# Patient Record
Sex: FEMALE | Race: WHITE
[De-identification: names, ages, dates, MRNs, and addresses within clinical notes are randomized per-mention and may not be internally consistent; named-entity substitution may affect disease eponyms.]

---

## 2018-04-27 ENCOUNTER — HOSPITAL ENCOUNTER (EMERGENCY)
Dept: HOSPITAL 96 - M.ERS | Age: 33
Discharge: HOME | End: 2018-04-27
Payer: COMMERCIAL

## 2018-04-27 VITALS — HEIGHT: 64 IN | BODY MASS INDEX: 20.49 KG/M2 | WEIGHT: 120 LBS

## 2018-04-27 VITALS — SYSTOLIC BLOOD PRESSURE: 97 MMHG | DIASTOLIC BLOOD PRESSURE: 50 MMHG

## 2018-04-27 DIAGNOSIS — Z90.710: ICD-10-CM

## 2018-04-27 DIAGNOSIS — Z88.8: ICD-10-CM

## 2018-04-27 DIAGNOSIS — L02.415: Primary | ICD-10-CM

## 2018-04-27 DIAGNOSIS — Z88.0: ICD-10-CM

## 2018-04-27 DIAGNOSIS — Z87.442: ICD-10-CM

## 2018-04-27 DIAGNOSIS — Z90.49: ICD-10-CM

## 2018-04-27 DIAGNOSIS — Z91.040: ICD-10-CM

## 2018-04-27 DIAGNOSIS — Z88.1: ICD-10-CM

## 2018-05-01 ENCOUNTER — HOSPITAL ENCOUNTER (EMERGENCY)
Dept: HOSPITAL 96 - M.OPS | Age: 33
End: 2018-05-01
Payer: COMMERCIAL
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
## 2018-05-01 DIAGNOSIS — S31.819A: Primary | ICD-10-CM

## 2020-11-09 ENCOUNTER — HOSPITAL ENCOUNTER (EMERGENCY)
Dept: HOSPITAL 96 - M.ERS | Age: 35
Discharge: HOME | End: 2020-11-09
Payer: COMMERCIAL

## 2020-11-09 VITALS — BODY MASS INDEX: 27.32 KG/M2 | HEIGHT: 64 IN | WEIGHT: 160.01 LBS

## 2020-11-09 VITALS — DIASTOLIC BLOOD PRESSURE: 70 MMHG | SYSTOLIC BLOOD PRESSURE: 115 MMHG

## 2020-11-09 DIAGNOSIS — R19.7: ICD-10-CM

## 2020-11-09 DIAGNOSIS — Z88.2: ICD-10-CM

## 2020-11-09 DIAGNOSIS — Z90.49: ICD-10-CM

## 2020-11-09 DIAGNOSIS — Z79.899: ICD-10-CM

## 2020-11-09 DIAGNOSIS — Z91.040: ICD-10-CM

## 2020-11-09 DIAGNOSIS — Z88.8: ICD-10-CM

## 2020-11-09 DIAGNOSIS — J02.9: ICD-10-CM

## 2020-11-09 DIAGNOSIS — Z98.51: ICD-10-CM

## 2020-11-09 DIAGNOSIS — B34.9: Primary | ICD-10-CM

## 2020-11-09 DIAGNOSIS — Z88.0: ICD-10-CM

## 2020-11-09 DIAGNOSIS — Z20.828: ICD-10-CM

## 2020-11-09 DIAGNOSIS — R41.0: ICD-10-CM

## 2020-11-09 DIAGNOSIS — M54.2: ICD-10-CM

## 2020-11-09 DIAGNOSIS — E86.0: ICD-10-CM

## 2020-11-09 DIAGNOSIS — Z87.442: ICD-10-CM

## 2021-09-20 ENCOUNTER — HOSPITAL ENCOUNTER (EMERGENCY)
Dept: HOSPITAL 96 - M.ERS | Age: 36
Discharge: HOME | End: 2021-09-20
Payer: COMMERCIAL

## 2021-09-20 VITALS — HEIGHT: 64 IN | BODY MASS INDEX: 21.34 KG/M2 | WEIGHT: 125 LBS

## 2021-09-20 VITALS — SYSTOLIC BLOOD PRESSURE: 110 MMHG | DIASTOLIC BLOOD PRESSURE: 54 MMHG

## 2021-09-20 DIAGNOSIS — Z20.822: ICD-10-CM

## 2021-09-20 DIAGNOSIS — Z87.442: ICD-10-CM

## 2021-09-20 DIAGNOSIS — Z90.49: ICD-10-CM

## 2021-09-20 DIAGNOSIS — Z88.8: ICD-10-CM

## 2021-09-20 DIAGNOSIS — Z88.0: ICD-10-CM

## 2021-09-20 DIAGNOSIS — Z90.710: ICD-10-CM

## 2021-09-20 DIAGNOSIS — R10.9: Primary | ICD-10-CM

## 2021-09-20 DIAGNOSIS — Z88.1: ICD-10-CM

## 2021-09-20 DIAGNOSIS — Z79.899: ICD-10-CM

## 2021-09-20 DIAGNOSIS — Z91.040: ICD-10-CM

## 2021-09-20 DIAGNOSIS — Z88.2: ICD-10-CM

## 2021-09-20 LAB
ABSOLUTE BASOPHILS: 0.1 THOU/UL (ref 0–0.2)
ABSOLUTE EOSINOPHILS: 0.3 THOU/UL (ref 0–0.7)
ABSOLUTE MONOCYTES: 0.5 THOU/UL (ref 0–1.2)
ALBUMIN SERPL-MCNC: 4.4 G/DL (ref 3.4–5)
ALP SERPL-CCNC: 56 U/L (ref 46–116)
ALT SERPL-CCNC: 40 U/L (ref 30–65)
ANION GAP SERPL CALC-SCNC: 9 MMOL/L (ref 7–16)
AST SERPL-CCNC: 23 U/L (ref 15–37)
BASOPHILS NFR BLD AUTO: 1.1 %
BILIRUB SERPL-MCNC: 0.4 MG/DL
BILIRUB UR-MCNC: NEGATIVE MG/DL
BUN SERPL-MCNC: 18 MG/DL (ref 7–18)
CALCIUM SERPL-MCNC: 8.8 MG/DL (ref 8.5–10.1)
CHLORIDE SERPL-SCNC: 103 MMOL/L (ref 98–107)
CO2 SERPL-SCNC: 28 MMOL/L (ref 21–32)
COLOR UR: YELLOW
CREAT SERPL-MCNC: 1.1 MG/DL (ref 0.6–1.3)
EOSINOPHIL NFR BLD: 4.1 %
GLUCOSE SERPL-MCNC: 80 MG/DL (ref 70–99)
GRANULOCYTES NFR BLD MANUAL: 56.8 %
HCT VFR BLD CALC: 43.8 % (ref 37–47)
HGB BLD-MCNC: 14.7 GM/DL (ref 12–15)
KETONES UR STRIP-MCNC: NEGATIVE MG/DL
LYMPHOCYTES # BLD: 2.2 THOU/UL (ref 0.8–5.3)
LYMPHOCYTES NFR BLD AUTO: 31.5 %
MCH RBC QN AUTO: 32.3 PG (ref 26–34)
MCHC RBC AUTO-ENTMCNC: 33.6 G/DL (ref 28–37)
MCV RBC: 96.1 FL (ref 80–100)
MONOCYTES NFR BLD: 6.5 %
MPV: 8.6 FL. (ref 7.2–11.1)
NEUTROPHILS # BLD: 3.9 THOU/UL (ref 1.6–8.1)
NUCLEATED RBCS: 0 /100WBC
PLATELET COUNT*: 221 THOU/UL (ref 150–400)
POTASSIUM SERPL-SCNC: 3.9 MMOL/L (ref 3.5–5.1)
PROT SERPL-MCNC: 8.2 G/DL (ref 6.4–8.2)
PROT UR QL STRIP: NEGATIVE
RBC # BLD AUTO: 4.56 MIL/UL (ref 4.2–5)
RBC # UR STRIP: NEGATIVE /UL
RDW-CV: 12.6 % (ref 10.5–14.5)
SODIUM SERPL-SCNC: 140 MMOL/L (ref 136–145)
SP GR UR STRIP: 1.02 (ref 1–1.03)
URINE CLARITY: CLEAR
URINE GLUCOSE-RANDOM: NEGATIVE
URINE LEUKOCYTES-REFLEX: NEGATIVE
URINE NITRITE-REFLEX: NEGATIVE
UROBILINOGEN UR STRIP-ACNC: 0.2 E.U./DL (ref 0.2–1)
WBC # BLD AUTO: 6.9 THOU/UL (ref 4–11)

## 2021-09-21 NOTE — EKG
[place] [address]
Phone:  [phone]                     ELECTROCARDIOGRAM REPORT      
_______________________________________________________________________________
 
Name:         SENARETHA RIAZ             Room:                     [facility]#:    [id number]     Account #:     [id number]  
Admission:    21    Attend Phys:                     
Discharge:    21    Date of Birth: [birth year]  
Date of Service: 21  Report #:      [id number]
        [id number]
_______________________________________________________________________________
THIS REPORT FOR:  //name//                      
 
                         [facility] ED
                                       
Test Date:    2021               Test Time:    19:33:20
Pat Name:     ARETHA SEN              Department:   
Patient ID:   [id number]            Room:          
Gender:       F                        Technician:   LUCAS
:          1985               Requested By: Andreas Quezada
Order Number: [id number] MD:   Pj Bernardo
                                 Measurements
Intervals                              Axis          
Rate:         53                       P:            -7
NH:           106                      QRS:          74
QRSD:         87                       T:            31
QT:           419                                    
QTc:          394                                    
                           Interpretive Statements
Sinus rhythm
Short NH interval
Abnormal R-wave progression, early transition
Compared to ECG 2017 17:36:46
No significant changes
Electronically Signed On 2021 11:45:15 CDT by Pj Bernardo
[email or web address]
 
 
 
 
 
 
 
 
 
 
 
 
 
 
 
 
 
 
 
  <ELECTRONICALLY SIGNED>
                                           By: Pj Bernardo MD, FACC   
  21     1145
D: 21   _____________________________________
T: 21   Pj Bernardo MD, [facility]     /EPI

## 2021-10-18 ENCOUNTER — HOSPITAL ENCOUNTER (EMERGENCY)
Dept: HOSPITAL 96 - M.ERS | Age: 36
Discharge: HOME | End: 2021-10-18
Payer: COMMERCIAL

## 2021-10-18 VITALS — HEIGHT: 64 IN | BODY MASS INDEX: 20.83 KG/M2 | WEIGHT: 122 LBS

## 2021-10-18 VITALS — DIASTOLIC BLOOD PRESSURE: 70 MMHG | SYSTOLIC BLOOD PRESSURE: 101 MMHG

## 2021-10-18 DIAGNOSIS — Z98.51: ICD-10-CM

## 2021-10-18 DIAGNOSIS — Z91.040: ICD-10-CM

## 2021-10-18 DIAGNOSIS — Z88.8: ICD-10-CM

## 2021-10-18 DIAGNOSIS — Z90.49: ICD-10-CM

## 2021-10-18 DIAGNOSIS — Z90.711: ICD-10-CM

## 2021-10-18 DIAGNOSIS — Z87.442: ICD-10-CM

## 2021-10-18 DIAGNOSIS — F17.210: ICD-10-CM

## 2021-10-18 DIAGNOSIS — N83.201: ICD-10-CM

## 2021-10-18 DIAGNOSIS — R11.2: ICD-10-CM

## 2021-10-18 DIAGNOSIS — Z88.2: ICD-10-CM

## 2021-10-18 DIAGNOSIS — Z88.0: ICD-10-CM

## 2021-10-18 DIAGNOSIS — Z79.899: ICD-10-CM

## 2021-10-18 DIAGNOSIS — N39.0: ICD-10-CM

## 2021-10-18 DIAGNOSIS — N83.202: Primary | ICD-10-CM

## 2021-10-18 LAB
ABSOLUTE BASOPHILS: 0 THOU/UL (ref 0–0.2)
ABSOLUTE EOSINOPHILS: 0.3 THOU/UL (ref 0–0.7)
ABSOLUTE MONOCYTES: 0.4 THOU/UL (ref 0–1.2)
ALBUMIN SERPL-MCNC: 3.6 G/DL (ref 3.4–5)
ALP SERPL-CCNC: 63 U/L (ref 46–116)
ALT SERPL-CCNC: 298 U/L (ref 30–65)
ANION GAP SERPL CALC-SCNC: 12 MMOL/L (ref 7–16)
AST SERPL-CCNC: 148 U/L (ref 15–37)
BASOPHILS NFR BLD AUTO: 0.8 %
BILIRUB SERPL-MCNC: 0.6 MG/DL
BILIRUB UR-MCNC: NEGATIVE MG/DL
BUN SERPL-MCNC: 17 MG/DL (ref 7–18)
CALCIUM SERPL-MCNC: 8.3 MG/DL (ref 8.5–10.1)
CHLORIDE SERPL-SCNC: 103 MMOL/L (ref 98–107)
CO2 SERPL-SCNC: 23 MMOL/L (ref 21–32)
COLOR UR: YELLOW
CREAT SERPL-MCNC: 0.9 MG/DL (ref 0.6–1.3)
EOSINOPHIL NFR BLD: 4.6 %
GLUCOSE SERPL-MCNC: 100 MG/DL (ref 70–99)
GRANULOCYTES NFR BLD MANUAL: 55.8 %
HCT VFR BLD CALC: 38.5 % (ref 37–47)
HGB BLD-MCNC: 13.1 GM/DL (ref 12–15)
KETONES UR STRIP-MCNC: NEGATIVE MG/DL
LIPASE: 134 U/L (ref 73–393)
LYMPHOCYTES # BLD: 1.7 THOU/UL (ref 0.8–5.3)
LYMPHOCYTES NFR BLD AUTO: 31.2 %
MCH RBC QN AUTO: 32.2 PG (ref 26–34)
MCHC RBC AUTO-ENTMCNC: 34 G/DL (ref 28–37)
MCV RBC: 94.8 FL (ref 80–100)
MONOCYTES NFR BLD: 7.6 %
MPV: 7.6 FL. (ref 7.2–11.1)
NEUTROPHILS # BLD: 3.1 THOU/UL (ref 1.6–8.1)
NUCLEATED RBCS: 0 /100WBC
PLATELET COUNT*: 227 THOU/UL (ref 150–400)
POTASSIUM SERPL-SCNC: 3.5 MMOL/L (ref 3.5–5.1)
PROT SERPL-MCNC: 7 G/DL (ref 6.4–8.2)
PROT UR QL STRIP: NEGATIVE
RBC # BLD AUTO: 4.06 MIL/UL (ref 4.2–5)
RBC # UR STRIP: NEGATIVE /UL
RDW-CV: 12.6 % (ref 10.5–14.5)
SODIUM SERPL-SCNC: 138 MMOL/L (ref 136–145)
SP GR UR STRIP: 1.01 (ref 1–1.03)
SQUAMOUS: (no result) /LPF (ref 0–3)
URINE CLARITY: CLEAR
URINE GLUCOSE-RANDOM: NEGATIVE
URINE LEUKOCYTES-REFLEX: NEGATIVE
URINE NITRITE-REFLEX: POSITIVE
URINE WBC-REFLEX: (no result) /HPF (ref 0–5)
UROBILINOGEN UR STRIP-ACNC: 0.2 E.U./DL (ref 0.2–1)
WBC # BLD AUTO: 5.5 THOU/UL (ref 4–11)

## 2021-10-18 NOTE — EKG
[place] [address]
Phone:  [phone]                     ELECTROCARDIOGRAM REPORT      
_______________________________________________________________________________
 
Name:         ARETHA SEN             Room:                     [facility] 
M.R.#:    [id number]     Account #:     [id number]  
Admission:    10/18/21    Attend Phys:                     
Discharge:                Date of Birth: [birth year]  
Date of Service: 10/18/21 1451  Report #:      [id number]
        [id number]
_______________________________________________________________________________
THIS REPORT FOR:  //name//                      
 
                         [facility] ED
                                       
Test Date:    2021-10-18               Test Time:    14:51:01
Pat Name:     ARETHA SEN              Department:   
Patient ID:   [id number]            Room:          
Gender:       F                        Technician:   PHUONG
:          1985               Requested By: Mary Easley
Order Number: [id number] MD:   Job Fernandes
                                 Measurements
Intervals                              Axis          
Rate:         68                       P:            64
IA:           109                      QRS:          72
QRSD:         89                       T:            20
QT:           418                                    
QTc:          445                                    
                           Interpretive Statements
Sinus rhythm
Short IA interval
RSR' in V1 or V2, probably normal variant
Compared to ECG 2021 19:33:20
RSR' in V1 or V2 now present
rate has increased
Electronically Signed On 10- 16:20:38 CDT by Job Fernandes
[email or web address]
 
 
 
 
 
 
 
 
 
 
 
 
 
 
 
 
 
 
  <ELECTRONICALLY SIGNED>
                                           By: Job Fernandes MD, FAC      
  10/18/21     1620
D: 10/18/21 1451   _____________________________________
T: 10/18/21 1451   Job Fernandes MD, [facility]        /EPI

## 2021-10-21 ENCOUNTER — HOSPITAL ENCOUNTER (EMERGENCY)
Dept: HOSPITAL 96 - M.ERS | Age: 36
Discharge: HOME | End: 2021-10-21
Payer: COMMERCIAL

## 2021-10-21 VITALS — BODY MASS INDEX: 22.2 KG/M2 | WEIGHT: 130.01 LBS | HEIGHT: 64 IN

## 2021-10-21 VITALS — SYSTOLIC BLOOD PRESSURE: 104 MMHG | DIASTOLIC BLOOD PRESSURE: 66 MMHG

## 2021-10-21 DIAGNOSIS — Z91.040: ICD-10-CM

## 2021-10-21 DIAGNOSIS — Z88.0: ICD-10-CM

## 2021-10-21 DIAGNOSIS — Z98.51: ICD-10-CM

## 2021-10-21 DIAGNOSIS — Z88.2: ICD-10-CM

## 2021-10-21 DIAGNOSIS — R10.84: Primary | ICD-10-CM

## 2021-10-21 DIAGNOSIS — R11.2: ICD-10-CM

## 2021-10-21 DIAGNOSIS — Z79.899: ICD-10-CM

## 2021-10-21 DIAGNOSIS — Z87.42: ICD-10-CM

## 2021-10-21 DIAGNOSIS — R74.01: ICD-10-CM

## 2021-10-21 DIAGNOSIS — Z79.2: ICD-10-CM

## 2021-10-21 DIAGNOSIS — Z88.1: ICD-10-CM

## 2021-10-21 DIAGNOSIS — Z87.442: ICD-10-CM

## 2021-10-21 DIAGNOSIS — Z90.49: ICD-10-CM

## 2021-10-21 LAB
ABSOLUTE BASOPHILS: 0 THOU/UL (ref 0–0.2)
ABSOLUTE EOSINOPHILS: 0.2 THOU/UL (ref 0–0.7)
ABSOLUTE MONOCYTES: 0.3 THOU/UL (ref 0–1.2)
ALBUMIN SERPL-MCNC: 3.5 G/DL (ref 3.4–5)
ALP SERPL-CCNC: 102 U/L (ref 46–116)
ALT SERPL-CCNC: 588 U/L (ref 30–65)
ANION GAP SERPL CALC-SCNC: 7 MMOL/L (ref 7–16)
AST SERPL-CCNC: 227 U/L (ref 15–37)
BASOPHILS NFR BLD AUTO: 0.8 %
BILIRUB SERPL-MCNC: 0.3 MG/DL
BILIRUB UR-MCNC: NEGATIVE MG/DL
BUN SERPL-MCNC: 15 MG/DL (ref 7–18)
CALCIUM SERPL-MCNC: 8.3 MG/DL (ref 8.5–10.1)
CHLORIDE SERPL-SCNC: 104 MMOL/L (ref 98–107)
CO2 SERPL-SCNC: 25 MMOL/L (ref 21–32)
COLOR UR: YELLOW
CREAT SERPL-MCNC: 1 MG/DL (ref 0.6–1.3)
EOSINOPHIL NFR BLD: 5.2 %
GLUCOSE SERPL-MCNC: 98 MG/DL (ref 70–99)
GRANULOCYTES NFR BLD MANUAL: 57.7 %
HCT VFR BLD CALC: 40.4 % (ref 37–47)
HGB BLD-MCNC: 13.5 GM/DL (ref 12–15)
KETONES UR STRIP-MCNC: NEGATIVE MG/DL
LYMPHOCYTES # BLD: 1.4 THOU/UL (ref 0.8–5.3)
LYMPHOCYTES NFR BLD AUTO: 29.4 %
MCH RBC QN AUTO: 31.6 PG (ref 26–34)
MCHC RBC AUTO-ENTMCNC: 33.3 G/DL (ref 28–37)
MCV RBC: 95 FL (ref 80–100)
MONOCYTES NFR BLD: 6.9 %
MPV: 8 FL. (ref 7.2–11.1)
NEUTROPHILS # BLD: 2.7 THOU/UL (ref 1.6–8.1)
NITRITE UR QL STRIP: NEGATIVE
NUCLEATED RBCS: 0 /100WBC
PLATELET COUNT*: 206 THOU/UL (ref 150–400)
POTASSIUM SERPL-SCNC: 4.1 MMOL/L (ref 3.5–5.1)
PROT SERPL-MCNC: 7.1 G/DL (ref 6.4–8.2)
PROT UR QL STRIP: NEGATIVE
RBC # BLD AUTO: 4.26 MIL/UL (ref 4.2–5)
RBC # UR STRIP: NEGATIVE /UL
RDW-CV: 12.3 % (ref 10.5–14.5)
SODIUM SERPL-SCNC: 136 MMOL/L (ref 136–145)
SP GR UR STRIP: 1.01 (ref 1–1.03)
URINE CLARITY: CLEAR
URINE GLUCOSE-RANDOM: NEGATIVE
URINE LEUKOCYTES: NEGATIVE
UROBILINOGEN UR STRIP-ACNC: 0.2 E.U./DL (ref 0.2–1)
WBC # BLD AUTO: 4.8 THOU/UL (ref 4–11)

## 2022-01-03 ENCOUNTER — HOSPITAL ENCOUNTER (EMERGENCY)
Dept: HOSPITAL 96 - M.ERS | Age: 37
Discharge: LEFT BEFORE DISCHARGE | End: 2022-01-03
Payer: COMMERCIAL

## 2022-01-03 VITALS — BODY MASS INDEX: 21.68 KG/M2 | HEIGHT: 64 IN | WEIGHT: 127.01 LBS

## 2022-01-03 DIAGNOSIS — Z88.8: ICD-10-CM

## 2022-01-03 DIAGNOSIS — F17.210: ICD-10-CM

## 2022-01-03 DIAGNOSIS — Z88.2: ICD-10-CM

## 2022-01-03 DIAGNOSIS — Z87.442: ICD-10-CM

## 2022-01-03 DIAGNOSIS — Z88.0: ICD-10-CM

## 2022-01-03 DIAGNOSIS — H66.91: Primary | ICD-10-CM

## 2022-01-03 DIAGNOSIS — Z98.51: ICD-10-CM

## 2022-01-03 DIAGNOSIS — Z91.040: ICD-10-CM

## 2022-01-03 DIAGNOSIS — Z98.890: ICD-10-CM

## 2022-01-03 DIAGNOSIS — Z90.49: ICD-10-CM

## 2022-01-03 DIAGNOSIS — Z79.899: ICD-10-CM

## 2022-01-09 ENCOUNTER — HOSPITAL ENCOUNTER (EMERGENCY)
Dept: HOSPITAL 96 - M.ERS | Age: 37
Discharge: LEFT BEFORE DISCHARGE | End: 2022-01-09
Payer: COMMERCIAL

## 2022-01-09 DIAGNOSIS — H90.12: Primary | ICD-10-CM

## 2022-01-09 DIAGNOSIS — Z53.21: ICD-10-CM
